# Patient Record
Sex: FEMALE | Race: BLACK OR AFRICAN AMERICAN | URBAN - METROPOLITAN AREA
[De-identification: names, ages, dates, MRNs, and addresses within clinical notes are randomized per-mention and may not be internally consistent; named-entity substitution may affect disease eponyms.]

---

## 2021-04-22 ENCOUNTER — OFFICE VISIT (OUTPATIENT)
Dept: URBAN - METROPOLITAN AREA CLINIC 98 | Facility: CLINIC | Age: 41
End: 2021-04-22
Payer: COMMERCIAL

## 2021-04-22 VITALS
WEIGHT: 155 LBS | HEIGHT: 62 IN | BODY MASS INDEX: 28.52 KG/M2 | HEART RATE: 111 BPM | DIASTOLIC BLOOD PRESSURE: 76 MMHG | SYSTOLIC BLOOD PRESSURE: 117 MMHG | TEMPERATURE: 98.2 F

## 2021-04-22 DIAGNOSIS — R10.9 ABDOMINAL CRAMPING: ICD-10-CM

## 2021-04-22 DIAGNOSIS — K59.09 CHRONIC CONSTIPATION: ICD-10-CM

## 2021-04-22 PROBLEM — 162031009 INDIGESTION: Status: ACTIVE | Noted: 2021-04-22

## 2021-04-22 PROCEDURE — 99204 OFFICE O/P NEW MOD 45 MIN: CPT | Performed by: INTERNAL MEDICINE

## 2021-04-22 RX ORDER — CARAWAY OIL/LEVOMENTHOL 25-20.75MG
AS DIRECTED CAPSULE ORAL
Qty: 60 | Refills: 1 | OUTPATIENT
Start: 2021-04-22 | End: 2021-06-21

## 2021-04-22 RX ORDER — LACTOBACILLUS RHAMNOSUS GG 10B CELL
AS DIRECTED CAPSULE ORAL
Status: ACTIVE | COMMUNITY

## 2021-04-22 RX ORDER — MELATONIN 5 MG
1 TABLET IN THE EVENING TABLET ORAL ONCE A DAY
Status: ACTIVE | COMMUNITY

## 2021-04-22 RX ORDER — FOLIC ACID 0.4 MG
1 TABLET TABLET ORAL ONCE A DAY
Status: ACTIVE | COMMUNITY

## 2021-04-22 NOTE — HPI-TODAY'S VISIT:
Ms. Coyne is a 41 yo F presenting with abdominal discomfort. She is having abdominal cramping and she feels like something is moving in her stomach. This has been going on for 3-4 months. She has the sensations all the time. It is improved with Culturelle. She has no nausea or vomiting. She has never had a colonoscopy. She has a bowel movement twice per day. She may skip 1-2 days. She does have hard stools and constipation occasionally. No blood in the stools. No unintentional weight loss. She hears gurgling in her stomach. No changes in her diet. She has no heartburn symptoms. She takes Aleve and Advil intermittently but not monthly.   She has tried no other medications for these symptoms. She has noticed that she does not drink much water.   External H pylori test 3/21 reviewed: negative

## 2021-05-11 ENCOUNTER — OFFICE VISIT (OUTPATIENT)
Dept: URBAN - METROPOLITAN AREA CLINIC 97 | Facility: CLINIC | Age: 41
End: 2021-05-11
Payer: COMMERCIAL

## 2021-05-11 DIAGNOSIS — K59.01 CONSTIPATION: ICD-10-CM

## 2021-05-11 DIAGNOSIS — K30 INDIGESTION: ICD-10-CM

## 2021-05-11 DIAGNOSIS — K76.0 FATTY (CHANGE OF) LIVER, NOT ELSEWHERE CLASSIFIED: ICD-10-CM

## 2021-05-11 DIAGNOSIS — K82.4 GALLBLADDER POLYP: ICD-10-CM

## 2021-05-11 PROCEDURE — 76705 ECHO EXAM OF ABDOMEN: CPT | Performed by: INTERNAL MEDICINE

## 2021-05-11 RX ORDER — MELATONIN 5 MG
1 TABLET IN THE EVENING TABLET ORAL ONCE A DAY
Status: ACTIVE | COMMUNITY

## 2021-05-11 RX ORDER — LACTOBACILLUS RHAMNOSUS GG 10B CELL
AS DIRECTED CAPSULE ORAL
Status: ACTIVE | COMMUNITY

## 2021-05-11 RX ORDER — CARAWAY OIL/LEVOMENTHOL 25-20.75MG
AS DIRECTED CAPSULE ORAL
Qty: 60 | Refills: 1 | Status: ACTIVE | COMMUNITY
Start: 2021-04-22 | End: 2021-06-21

## 2021-05-11 RX ORDER — FOLIC ACID 0.4 MG
1 TABLET TABLET ORAL ONCE A DAY
Status: ACTIVE | COMMUNITY

## 2021-05-26 ENCOUNTER — LAB OUTSIDE AN ENCOUNTER (OUTPATIENT)
Dept: URBAN - METROPOLITAN AREA CLINIC 98 | Facility: CLINIC | Age: 41
End: 2021-05-26

## 2021-05-27 ENCOUNTER — OFFICE VISIT (OUTPATIENT)
Dept: URBAN - METROPOLITAN AREA CLINIC 98 | Facility: CLINIC | Age: 41
End: 2021-05-27
Payer: COMMERCIAL

## 2021-05-27 VITALS
HEIGHT: 62 IN | DIASTOLIC BLOOD PRESSURE: 79 MMHG | WEIGHT: 151.4 LBS | SYSTOLIC BLOOD PRESSURE: 118 MMHG | TEMPERATURE: 97.5 F | BODY MASS INDEX: 27.86 KG/M2 | HEART RATE: 78 BPM

## 2021-05-27 DIAGNOSIS — K76.0 FATTY LIVER DISEASE, NONALCOHOLIC: ICD-10-CM

## 2021-05-27 DIAGNOSIS — K82.4 GALLBLADDER POLYP: ICD-10-CM

## 2021-05-27 DIAGNOSIS — R10.9 ABDOMINAL CRAMPING: ICD-10-CM

## 2021-05-27 PROCEDURE — 99214 OFFICE O/P EST MOD 30 MIN: CPT | Performed by: INTERNAL MEDICINE

## 2021-05-27 RX ORDER — MELATONIN 5 MG
1 TABLET IN THE EVENING TABLET ORAL ONCE A DAY
Status: ACTIVE | COMMUNITY

## 2021-05-27 RX ORDER — FOLIC ACID 0.4 MG
1 TABLET TABLET ORAL ONCE A DAY
Status: ACTIVE | COMMUNITY

## 2021-05-27 RX ORDER — LACTOBACILLUS RHAMNOSUS GG 10B CELL
AS DIRECTED CAPSULE ORAL
Status: ACTIVE | COMMUNITY

## 2021-05-27 RX ORDER — CARAWAY OIL/LEVOMENTHOL 25-20.75MG
AS DIRECTED CAPSULE ORAL
Qty: 60 | Refills: 1 | Status: ACTIVE | COMMUNITY
Start: 2021-04-22 | End: 2021-06-21

## 2021-05-27 NOTE — HPI-TODAY'S VISIT:
//Gallbladder polyps: found on May 2021. No abdominal pain. No nausea or vomiting.   //Abdominal cramping: happening mostly around her menstrual periods or with dairy. Mild. Happens about 2 weeks out of the month period.   //Constipation: improved with more water in the diet and fiber. No blood in the stool.  //Fatty liver: found on ultrasound. She is not eating meat but also not exercising much. No change in weight recently.  I personally reviewed:  5/11/2021 RUQ U/S: gallbladder polyps, mild fatty liver infiltration. Will need repeat in November 2021

## 2021-05-29 LAB
A/G RATIO: 1.7
ALBUMIN: 4.5
ALKALINE PHOSPHATASE: 67
ALT (SGPT): 10
AST (SGOT): 25
BILIRUBIN, TOTAL: 0.3
BUN/CREATININE RATIO: 7
BUN: 5
CALCIUM: 9.1
CARBON DIOXIDE, TOTAL: 22
CHLORIDE: 101
CREATININE: 0.72
DEAMIDATED GLIADIN ABS, IGA: 8
EGFR IF AFRICN AM: 120
EGFR IF NONAFRICN AM: 104
GLOBULIN, TOTAL: 2.7
GLUCOSE: 89
IMMUNOGLOBULIN A, QN, SERUM: 219
POTASSIUM: 4.3
PROTEIN, TOTAL: 7.2
SODIUM: 137
T-TRANSGLUTAMINASE (TTG) IGA: <2

## 2021-05-31 LAB
DEAMIDATED GLIADIN ABS, IGA: 7
IMMUNOGLOBULIN A, QN, SERUM: 210
T-TRANSGLUTAMINASE (TTG) IGA: <2

## 2021-07-15 ENCOUNTER — OFFICE VISIT (OUTPATIENT)
Dept: URBAN - METROPOLITAN AREA CLINIC 13 | Facility: CLINIC | Age: 41
End: 2021-07-15
Payer: COMMERCIAL

## 2021-07-15 VITALS
DIASTOLIC BLOOD PRESSURE: 65 MMHG | BODY MASS INDEX: 27.64 KG/M2 | SYSTOLIC BLOOD PRESSURE: 117 MMHG | WEIGHT: 150.2 LBS | HEART RATE: 88 BPM | TEMPERATURE: 98.2 F | HEIGHT: 62 IN

## 2021-07-15 DIAGNOSIS — K58.1 IRRITABLE BOWEL SYNDROME WITH CONSTIPATION: ICD-10-CM

## 2021-07-15 DIAGNOSIS — K76.0 FATTY LIVER DISEASE, NONALCOHOLIC: ICD-10-CM

## 2021-07-15 DIAGNOSIS — K82.4 GALLBLADDER POLYP: ICD-10-CM

## 2021-07-15 PROCEDURE — 99214 OFFICE O/P EST MOD 30 MIN: CPT | Performed by: INTERNAL MEDICINE

## 2021-07-15 RX ORDER — DICYCLOMINE HYDROCHLORIDE 20 MG/1
1 TABLET TABLET ORAL
Qty: 30 | Refills: 0 | OUTPATIENT
Start: 2021-07-15 | End: 2021-07-25

## 2021-07-15 RX ORDER — FOLIC ACID 0.4 MG
1 TABLET TABLET ORAL ONCE A DAY
Status: ACTIVE | COMMUNITY

## 2021-07-15 RX ORDER — MELATONIN 5 MG
1 TABLET IN THE EVENING TABLET ORAL ONCE A DAY
Status: ACTIVE | COMMUNITY

## 2021-07-15 RX ORDER — LACTOBACILLUS RHAMNOSUS GG 10B CELL
AS DIRECTED CAPSULE ORAL
Status: ACTIVE | COMMUNITY

## 2021-07-15 NOTE — HPI-TODAY'S VISIT:
She noticed that dairy makes her symptoms worse- she gets loud bowel sounds and abdominal cramping in her right side She has not tried the dicyclomine yet but has had improvement with Gas ex She has a BM at least once per day No blood in the stool. She does occasionally have constipation.    I personally reviewed:  5/27/21 ALT 10 AST 25 5/27/21: celiac panel- negative

## 2021-07-16 PROBLEM — 14760008 CONSTIPATION: Status: ACTIVE | Noted: 2021-04-22

## 2021-07-16 PROBLEM — 83132003 UPPER ABDOMINAL PAIN: Status: ACTIVE | Noted: 2021-04-22

## 2021-11-23 ENCOUNTER — OFFICE VISIT (OUTPATIENT)
Dept: URBAN - METROPOLITAN AREA CLINIC 97 | Facility: CLINIC | Age: 41
End: 2021-11-23

## 2022-01-11 ENCOUNTER — OFFICE VISIT (OUTPATIENT)
Dept: URBAN - METROPOLITAN AREA CLINIC 97 | Facility: CLINIC | Age: 42
End: 2022-01-11

## 2022-01-20 ENCOUNTER — OFFICE VISIT (OUTPATIENT)
Dept: URBAN - METROPOLITAN AREA CLINIC 98 | Facility: CLINIC | Age: 42
End: 2022-01-20

## 2022-02-08 ENCOUNTER — TELEPHONE ENCOUNTER (OUTPATIENT)
Dept: URBAN - METROPOLITAN AREA CLINIC 6 | Facility: CLINIC | Age: 42
End: 2022-02-08

## 2022-02-08 ENCOUNTER — TELEPHONE ENCOUNTER (OUTPATIENT)
Dept: URBAN - METROPOLITAN AREA CLINIC 98 | Facility: CLINIC | Age: 42
End: 2022-02-08

## 2022-02-08 ENCOUNTER — OFFICE VISIT (OUTPATIENT)
Dept: URBAN - METROPOLITAN AREA CLINIC 97 | Facility: CLINIC | Age: 42
End: 2022-02-08
Payer: COMMERCIAL

## 2022-02-08 DIAGNOSIS — K58.1 IRRITABLE BOWEL SYNDROME WITH CONSTIPATION: ICD-10-CM

## 2022-02-08 DIAGNOSIS — K76.0 FATTY LIVER DISEASE, NONALCOHOLIC: ICD-10-CM

## 2022-02-08 DIAGNOSIS — R10.84 ABDOMINAL CRAMPING, GENERALIZED: ICD-10-CM

## 2022-02-08 PROCEDURE — 76705 ECHO EXAM OF ABDOMEN: CPT | Performed by: INTERNAL MEDICINE

## 2022-02-08 RX ORDER — FOLIC ACID 0.4 MG
1 TABLET TABLET ORAL ONCE A DAY
Status: ACTIVE | COMMUNITY

## 2022-02-08 RX ORDER — MELATONIN 5 MG
1 TABLET IN THE EVENING TABLET ORAL ONCE A DAY
Status: ACTIVE | COMMUNITY

## 2022-02-08 RX ORDER — LACTOBACILLUS RHAMNOSUS GG 10B CELL
AS DIRECTED CAPSULE ORAL
Status: ACTIVE | COMMUNITY

## 2022-02-24 ENCOUNTER — WEB ENCOUNTER (OUTPATIENT)
Dept: URBAN - METROPOLITAN AREA CLINIC 98 | Facility: CLINIC | Age: 42
End: 2022-02-24

## 2022-02-24 ENCOUNTER — OFFICE VISIT (OUTPATIENT)
Dept: URBAN - METROPOLITAN AREA CLINIC 98 | Facility: CLINIC | Age: 42
End: 2022-02-24
Payer: COMMERCIAL

## 2022-02-24 DIAGNOSIS — K76.0 FATTY LIVER DISEASE, NONALCOHOLIC: ICD-10-CM

## 2022-02-24 DIAGNOSIS — K58.1 IRRITABLE BOWEL SYNDROME WITH CONSTIPATION: ICD-10-CM

## 2022-02-24 DIAGNOSIS — K82.4 GALLBLADDER POLYP: ICD-10-CM

## 2022-02-24 PROBLEM — 197315008: Status: ACTIVE | Noted: 2021-05-27

## 2022-02-24 PROBLEM — 440630006: Status: ACTIVE | Noted: 2021-07-15

## 2022-02-24 PROCEDURE — 99214 OFFICE O/P EST MOD 30 MIN: CPT | Performed by: INTERNAL MEDICINE

## 2022-02-24 RX ORDER — FOLIC ACID 0.4 MG
1 TABLET TABLET ORAL ONCE A DAY
Status: ACTIVE | COMMUNITY

## 2022-02-24 RX ORDER — LACTOBACILLUS RHAMNOSUS GG 10B CELL
AS DIRECTED CAPSULE ORAL
Status: ON HOLD | COMMUNITY

## 2022-02-24 RX ORDER — MELATONIN 5 MG
1 TABLET IN THE EVENING TABLET ORAL ONCE A DAY
Status: ACTIVE | COMMUNITY

## 2022-02-24 NOTE — HPI-TODAY'S VISIT:
Ms. Coyne is a 42 yo F presenting for followup.  She got back from [facility] in December 2022.  Fibroid surgery to be done in May 2022.  She gets a little spasm when she bends over. She does not get this with eating or bowel movements.  She is having a BM every day, no straining or constipation.  Had to have blood transfusion for heavy vaginal bleeding from fibroids in Octobr 2021.  She has been tested for Hep B and C in the past and was negative, per patient. She will check on these results.    I personally reviewed:  2/8/22 U/S: 0.7 gallbladder polyp (similar to last ultrasound), 2 hepatic cysts 5/11/2021 RUQ U/S: gallbladder polyps, mild fatty liver infiltration. Will need repeat in November 2021 5/27/21 ALT 10, AST 25

## 2024-03-29 ENCOUNTER — OV EP (OUTPATIENT)
Dept: URBAN - METROPOLITAN AREA CLINIC 98 | Facility: CLINIC | Age: 44
End: 2024-03-29
Payer: COMMERCIAL

## 2024-03-29 ENCOUNTER — LAB (OUTPATIENT)
Dept: URBAN - METROPOLITAN AREA CLINIC 98 | Facility: CLINIC | Age: 44
End: 2024-03-29

## 2024-03-29 VITALS
SYSTOLIC BLOOD PRESSURE: 136 MMHG | HEIGHT: 62 IN | BODY MASS INDEX: 30.55 KG/M2 | WEIGHT: 166 LBS | HEART RATE: 87 BPM | TEMPERATURE: 97.5 F | DIASTOLIC BLOOD PRESSURE: 70 MMHG

## 2024-03-29 DIAGNOSIS — K76.0 FATTY LIVER DISEASE, NONALCOHOLIC: ICD-10-CM

## 2024-03-29 DIAGNOSIS — K58.1 IRRITABLE BOWEL SYNDROME WITH CONSTIPATION: ICD-10-CM

## 2024-03-29 DIAGNOSIS — K76.9 LIVER LESION: ICD-10-CM

## 2024-03-29 DIAGNOSIS — K82.4 GALLBLADDER POLYP: ICD-10-CM

## 2024-03-29 PROBLEM — 300331000: Status: ACTIVE | Noted: 2024-03-29

## 2024-03-29 PROCEDURE — 99214 OFFICE O/P EST MOD 30 MIN: CPT | Performed by: INTERNAL MEDICINE

## 2024-03-29 RX ORDER — MELATONIN 5 MG
1 TABLET IN THE EVENING TABLET ORAL ONCE A DAY
Status: ACTIVE | COMMUNITY

## 2024-03-29 RX ORDER — FOLIC ACID 0.4 MG
1 TABLET TABLET ORAL ONCE A DAY
Status: ACTIVE | COMMUNITY

## 2024-03-29 RX ORDER — LACTOBACILLUS RHAMNOSUS GG 10B CELL
AS DIRECTED CAPSULE ORAL
Status: ON HOLD | COMMUNITY

## 2024-03-29 NOTE — HPI-TODAY'S VISIT:
Ms. Coyne is a 44 yo F presenting for followup for liver cyst. Last visit on 2/24/22.  Had a breast MRI and then had a CT Scan for liver and gallbladder cysts.  She was told she had some constipation on the CT scan.  Having incomplete stools. On Wegovy NO blood in stools.  No unintentional weight loss.  No abdominal pain or yellowing of skin or eyes.   I personally reviewed:  2/28/24 CT with contrast: innumerable hypodsne lesions scattered throughout the liver, posisble gallbladder polyp, stool in colon, possible small HH 2/8/22 U/S: 0.7 gallbladder polyp (similar to last ultrasound), 2 hepatic cysts 5/11/2021 RUQ U/S: gallbladder polyps, mild fatty liver infiltration. Will need repeat in November 2021 5/27/21 ALT 10, AST 25

## 2024-04-17 ENCOUNTER — LAB (OUTPATIENT)
Dept: URBAN - METROPOLITAN AREA CLINIC 98 | Facility: CLINIC | Age: 44
End: 2024-04-17

## 2024-04-17 PROBLEM — 85057007: Status: ACTIVE | Noted: 2024-04-17

## 2024-05-17 ENCOUNTER — OFFICE VISIT (OUTPATIENT)
Dept: URBAN - METROPOLITAN AREA CLINIC 98 | Facility: CLINIC | Age: 44
End: 2024-05-17

## 2025-08-19 ENCOUNTER — TELEPHONE ENCOUNTER (OUTPATIENT)
Dept: URBAN - METROPOLITAN AREA CLINIC 6 | Facility: CLINIC | Age: 45
End: 2025-08-19